# Patient Record
Sex: FEMALE | Race: OTHER | HISPANIC OR LATINO | Employment: UNEMPLOYED | URBAN - METROPOLITAN AREA
[De-identification: names, ages, dates, MRNs, and addresses within clinical notes are randomized per-mention and may not be internally consistent; named-entity substitution may affect disease eponyms.]

---

## 2022-07-28 ENCOUNTER — HOSPITAL ENCOUNTER (EMERGENCY)
Facility: HOSPITAL | Age: 27
Discharge: HOME | End: 2022-07-29
Attending: EMERGENCY MEDICINE

## 2022-07-28 DIAGNOSIS — R11.0 NAUSEA: ICD-10-CM

## 2022-07-28 DIAGNOSIS — R10.13 EPIGASTRIC PAIN: Primary | ICD-10-CM

## 2022-07-28 DIAGNOSIS — Z86.19 HISTORY OF HELICOBACTER PYLORI INFECTION: ICD-10-CM

## 2022-07-28 LAB
B-HCG UR QL: NEGATIVE
BILIRUB UR QL STRIP: NEGATIVE
CLARITY UR: ABNORMAL
COLOR UR: YELLOW
CTP QC/QA: YES
GLUCOSE UR QL STRIP: NEGATIVE
HGB UR QL STRIP: NEGATIVE
KETONES UR QL STRIP: NEGATIVE
LEUKOCYTE ESTERASE UR QL STRIP: NEGATIVE
NITRITE UR QL STRIP: NEGATIVE
PH UR STRIP: 8 [PH] (ref 5–8)
PROT UR QL STRIP: NEGATIVE
SP GR UR STRIP: 1.01 (ref 1–1.03)
URN SPEC COLLECT METH UR: ABNORMAL
UROBILINOGEN UR STRIP-ACNC: NEGATIVE EU/DL

## 2022-07-28 PROCEDURE — 81025 URINE PREGNANCY TEST: CPT | Performed by: PHYSICIAN ASSISTANT

## 2022-07-28 PROCEDURE — 81003 URINALYSIS AUTO W/O SCOPE: CPT | Performed by: PHYSICIAN ASSISTANT

## 2022-07-28 PROCEDURE — 99284 EMERGENCY DEPT VISIT MOD MDM: CPT

## 2022-07-28 RX ORDER — LIDOCAINE HYDROCHLORIDE 20 MG/ML
15 SOLUTION OROPHARYNGEAL ONCE
Status: COMPLETED | OUTPATIENT
Start: 2022-07-28 | End: 2022-07-29

## 2022-07-28 RX ORDER — MAG HYDROX/ALUMINUM HYD/SIMETH 200-200-20
30 SUSPENSION, ORAL (FINAL DOSE FORM) ORAL ONCE
Status: COMPLETED | OUTPATIENT
Start: 2022-07-28 | End: 2022-07-29

## 2022-07-28 RX ORDER — ONDANSETRON 4 MG/1
4 TABLET, ORALLY DISINTEGRATING ORAL
Status: COMPLETED | OUTPATIENT
Start: 2022-07-28 | End: 2022-07-29

## 2022-07-29 VITALS
BODY MASS INDEX: 29.37 KG/M2 | HEART RATE: 60 BPM | OXYGEN SATURATION: 100 % | WEIGHT: 172 LBS | HEIGHT: 64 IN | DIASTOLIC BLOOD PRESSURE: 78 MMHG | SYSTOLIC BLOOD PRESSURE: 123 MMHG | RESPIRATION RATE: 18 BRPM | TEMPERATURE: 98 F

## 2022-07-29 PROCEDURE — 25000003 PHARM REV CODE 250: Performed by: PHYSICIAN ASSISTANT

## 2022-07-29 RX ORDER — FAMOTIDINE 20 MG/1
20 TABLET, FILM COATED ORAL 2 TIMES DAILY
Qty: 60 TABLET | Refills: 0 | Status: SHIPPED | OUTPATIENT
Start: 2022-07-29 | End: 2022-08-28

## 2022-07-29 RX ORDER — ONDANSETRON 4 MG/1
4 TABLET, ORALLY DISINTEGRATING ORAL EVERY 6 HOURS PRN
Qty: 15 TABLET | Refills: 0 | Status: SHIPPED | OUTPATIENT
Start: 2022-07-29

## 2022-07-29 RX ADMIN — ALUMINUM HYDROXIDE, MAGNESIUM HYDROXIDE, AND SIMETHICONE 30 ML: 200; 200; 20 SUSPENSION ORAL at 12:07

## 2022-07-29 RX ADMIN — ONDANSETRON 4 MG: 4 TABLET, ORALLY DISINTEGRATING ORAL at 12:07

## 2022-07-29 RX ADMIN — LIDOCAINE HYDROCHLORIDE 15 ML: 20 SOLUTION ORAL; TOPICAL at 12:07

## 2022-07-29 NOTE — ED PROVIDER NOTES
Encounter Date: 2022       History     Chief Complaint   Patient presents with    Abdominal Pain     Patient reports a pain to epigastric area of abdominal, vomiting x 3 episodes. Symptoms started around 0500 today. Denies diarrhea, fevers. Patient reports an H.pylori infection 3 years ago. Denies taking medication prior to symptoms.     Vomiting     27yo F with chief complaint 2w hx constant epigastric pain, associated nausea. No emesis. Admits to hx similar pain with previous h pylori diagnosis 2-3y ago. No fever. Normal BMs. No melena or hematochezia. No hx gallbladder issues.  Denies history of pancreatitis.  No cough. No SOB or CP. Pain worsened with meals. Nausea worsened with meals.  Denies any urinary complaints.  Denies any recent illness.  Denies any known sick contacts.  No vaginal complaints.  Symptoms are acute, constant, mild.  No alleviating factors.  No radiation of symptoms.    . No other abdominal surgeries.     Depo-Provera contraception.  No daily prescription medications.        Review of patient's allergies indicates:  No Known Allergies  History reviewed. No pertinent past medical history.  History reviewed. No pertinent surgical history.  No family history on file.  Social History     Tobacco Use    Smoking status: Never Smoker    Smokeless tobacco: Never Used   Substance Use Topics    Alcohol use: Not Currently    Drug use: Never     Review of Systems   Constitutional: Positive for appetite change. Negative for fever.   Respiratory: Negative for cough and shortness of breath.    Cardiovascular: Negative for chest pain.   Gastrointestinal: Positive for abdominal pain and nausea. Negative for blood in stool, constipation, diarrhea and vomiting.   Genitourinary: Negative for dysuria, flank pain, frequency, vaginal bleeding, vaginal discharge and vaginal pain.   Musculoskeletal: Negative for myalgias, neck pain and neck stiffness.   Neurological: Negative for syncope.        Physical Exam     Initial Vitals [07/28/22 2149]   BP Pulse Resp Temp SpO2   120/87 65 16 98.8 °F (37.1 °C) 98 %      MAP       --         Physical Exam    Nursing note and vitals reviewed.  Constitutional: She appears well-developed and well-nourished. She is not diaphoretic. No distress.   Well-appearing, nontoxic.  Sitting upright on exam table.   HENT:   Head: Normocephalic and atraumatic.   Neck: Neck supple.   Normal range of motion.  Cardiovascular: Intact distal pulses.   Pulmonary/Chest: Breath sounds normal. No respiratory distress.   Abdominal: Abdomen is soft. Bowel sounds are normal. She exhibits no distension.   Mild epigastric tenderness.  Negative Vila sign.  No tenderness over McBurney's point.   Musculoskeletal:      Cervical back: Normal range of motion and neck supple.     Neurological: She is alert and oriented to person, place, and time. GCS score is 15. GCS eye subscore is 4. GCS verbal subscore is 5. GCS motor subscore is 6.   Skin: Skin is warm. Capillary refill takes less than 2 seconds.   Psychiatric: She has a normal mood and affect. Thought content normal.         ED Course   Procedures  Labs Reviewed   URINALYSIS, REFLEX TO URINE CULTURE - Abnormal; Notable for the following components:       Result Value    Appearance, UA Hazy (*)     All other components within normal limits    Narrative:     Specimen Source->Urine   POCT URINE PREGNANCY          Imaging Results    None          Medications   aluminum-magnesium hydroxide-simethicone 200-200-20 mg/5 mL suspension 30 mL (30 mLs Oral Given 7/29/22 0004)     And   LIDOcaine HCl 2% oral solution 15 mL (15 mLs Oral Given 7/29/22 0004)   ondansetron disintegrating tablet 4 mg (4 mg Oral Given 7/29/22 0003)     Medical Decision Making:   Differential Diagnosis:   GERD, gastroenteritis, H pylori, cholecystitis, pancreatitis, small-bowel obstruction, enteritis, colitis, constipation, UTI, pyelonephritis  Clinical Tests:   Lab Tests:  Ordered and Reviewed  ED Management:  Pain significantly improved on re-evaluation.  No longer with epigastric tenderness.  Overall, likely gastric etiology, will DC with Pepcid b.i.d., Zofran p.r.n., advised follow-up with Gastroenterology or local PCP.  Return precautions discussed.  Patient is comfortable with this plan, comfortable with outpatient follow-up.                      Clinical Impression:   Final diagnoses:  [R10.13] Epigastric pain (Primary)  [R11.0] Nausea  [Z86.19] History of Helicobacter pylori infection          ED Disposition Condition    Discharge Stable        ED Prescriptions     Medication Sig Dispense Start Date End Date Auth. Provider    famotidine (PEPCID) 20 MG tablet Take 1 tablet (20 mg total) by mouth 2 (two) times daily. 60 tablet 7/29/2022 8/28/2022 Sundeep Al PA-C    ondansetron (ZOFRAN-ODT) 4 MG TbDL Take 1 tablet (4 mg total) by mouth every 6 (six) hours as needed (Nausea). 15 tablet 7/29/2022  Sundeep Al PA-C        Follow-up Information     Follow up With Specialties Details Why Contact Info    [facility]  Schedule an appointment as soon as possible for a visit  To establish primary care physician, for reevaluation [address] [place] [id number] [phone]      [facility]-All Locations Gastroenterology Schedule an appointment as soon as possible for a visit  For reevaluation by GI physician [address] [place] [address]  [address]  [place] [address]  [phone]             Sundeep Al PA-C  07/29/22 0595

## 2022-07-29 NOTE — DISCHARGE INSTRUCTIONS
Tereza un seguimiento y establezca la atención con un proveedor de atención primaria local. Seguimiento establecer atención con un gastroenterólogo local para la reevaluación del dolor abdominal superior lazarus la historia de H. pylori. Pepcid dos veces al día. Zofran según sea necesario para las náuseas. Analilia muchos líquidos, manténgase miguel angel hidratado, progrese la dieta según lo tolere. Dieta estricta para la ERGE. Regrese a nitin servicio de urgencias si experimenta un empeoramiento del dolor a pesar del tratamiento, vómitos, si no puede comer o beber, si presenta heces negras o con giana, si se presenta cualquier otro problema.    Follow-up and establish care with a local primary care provider.  Follow-up establish care with a local gastroenterologist for re-evaluation of upper abdominal pain given history of H pylori.  Pepcid twice daily.  Zofran as needed for nausea.  Drink lots of fluids, stay well hydrated, progress diet as tolerated.  Strict GERD diet.  Return to this ED if you experience worsening pain despite treatment, vomiting, if unable to eat or drink, if you develop black or bloody stools, if any other problems occur.

## 2022-07-29 NOTE — ED TRIAGE NOTES
Pt presents to ED c/o epigastric abd pain and nausea started yesterday around 0500.  Denies cp, sob, diarrhea, vomiting or urinary symptoms.  Hx of h pylori.